# Patient Record
Sex: FEMALE | Race: WHITE
[De-identification: names, ages, dates, MRNs, and addresses within clinical notes are randomized per-mention and may not be internally consistent; named-entity substitution may affect disease eponyms.]

---

## 2020-05-09 ENCOUNTER — HOSPITAL ENCOUNTER (EMERGENCY)
Dept: HOSPITAL 19 - COL.ER | Age: 25
Discharge: HOME | End: 2020-05-09
Payer: SELF-PAY

## 2020-05-09 VITALS — SYSTOLIC BLOOD PRESSURE: 126 MMHG | TEMPERATURE: 98.7 F | DIASTOLIC BLOOD PRESSURE: 88 MMHG

## 2020-05-09 VITALS — HEIGHT: 65.98 IN | WEIGHT: 170.42 LBS | BODY MASS INDEX: 27.39 KG/M2

## 2020-05-09 VITALS — HEART RATE: 87 BPM

## 2020-05-09 DIAGNOSIS — Z3A.10: ICD-10-CM

## 2020-05-09 DIAGNOSIS — F17.210: ICD-10-CM

## 2020-05-09 DIAGNOSIS — O99.331: ICD-10-CM

## 2020-05-09 DIAGNOSIS — O21.9: Primary | ICD-10-CM

## 2020-05-09 LAB
ALBUMIN SERPL-MCNC: 4.7 GM/DL (ref 3.5–5)
ALP SERPL-CCNC: 43 U/L (ref 50–136)
ALT SERPL-CCNC: 15 U/L (ref 4–34)
ANION GAP SERPL CALC-SCNC: 12 MMOL/L (ref 7–16)
AST SERPL-CCNC: 21 U/L (ref 15–37)
B-HCG SERPL-SCNC: (no result) MIU/ML (ref 0–5)
BASOPHILS # BLD: 0 10*3/UL (ref 0–0.2)
BASOPHILS NFR BLD AUTO: 0.3 % (ref 0–2)
BILIRUB SERPL-MCNC: 0.8 MG/DL (ref 0–1)
BUN SERPL-MCNC: 11 MG/DL (ref 7–17)
CALCIUM SERPL-MCNC: 10 MG/DL (ref 8.4–10.2)
CHLORIDE SERPL-SCNC: 102 MMOL/L (ref 98–107)
CO2 SERPL-SCNC: 23 MMOL/L (ref 22–30)
CREAT SERPL-SCNC: 0.58 UMOL/L (ref 0.52–1.25)
CRP SERPL-MCNC: < 0.5 MG/DL (ref 0–0.9)
EOSINOPHIL # BLD: 0 10*3/UL (ref 0–0.7)
EOSINOPHIL NFR BLD: 0.2 % (ref 0–4)
ERYTHROCYTE [DISTWIDTH] IN BLOOD BY AUTOMATED COUNT: 11.9 % (ref 11.5–14.5)
GLUCOSE SERPL-MCNC: 100 MG/DL (ref 74–106)
GRANULOCYTES # BLD AUTO: 85.1 % (ref 42.2–75.2)
HCT VFR BLD AUTO: 43.9 % (ref 37–47)
HGB BLD-MCNC: 15.6 G/DL (ref 12.5–16)
HYALINE CASTS #/AREA URNS LPF: (no result) /LPF
KETONES UR STRIP.AUTO-MCNC: (no result) MG/DL
LYMPHOCYTES # BLD: 1.3 10*3/UL (ref 1.2–3.4)
LYMPHOCYTES NFR BLD: 10 % (ref 20–51)
MCH RBC QN AUTO: 31 PG (ref 27–31)
MCHC RBC AUTO-ENTMCNC: 36 G/DL (ref 33–37)
MCV RBC AUTO: 86 FL (ref 80–100)
MONOCYTES # BLD: 0.5 10*3/UL (ref 0.1–0.6)
MONOCYTES NFR BLD AUTO: 4.1 % (ref 1.7–9.3)
NEUTROPHILS # BLD: 10.8 10*3/UL (ref 1.4–6.5)
PH UR STRIP.AUTO: 5 [PH] (ref 5–8)
PLATELET # BLD AUTO: 172 K/MM3 (ref 130–400)
PMV BLD AUTO: 10.7 FL (ref 7.4–10.4)
POTASSIUM SERPL-SCNC: 3.6 MMOL/L (ref 3.4–5)
PROT SERPL-MCNC: 8 GM/DL (ref 6.4–8.2)
RBC # BLD AUTO: 5.11 M/MM3 (ref 4.1–5.3)
RBC # UR: (no result) /HPF
SODIUM SERPL-SCNC: 137 MMOL/L (ref 137–145)
SP GR UR STRIP.AUTO: 1.03 (ref 1–1.03)
UA DIPSTICK PNL UR STRIP.AUTO: (no result)
URN COLLECT METHOD CLASS: (no result)

## 2020-07-21 ENCOUNTER — HOSPITAL ENCOUNTER (EMERGENCY)
Dept: HOSPITAL 19 - COL.ER | Age: 25
Discharge: HOME | End: 2020-07-21
Payer: MEDICAID

## 2020-07-21 VITALS — TEMPERATURE: 98.5 F

## 2020-07-21 VITALS — HEIGHT: 65.98 IN | WEIGHT: 170.42 LBS | BODY MASS INDEX: 27.39 KG/M2

## 2020-07-21 VITALS — SYSTOLIC BLOOD PRESSURE: 113 MMHG | DIASTOLIC BLOOD PRESSURE: 58 MMHG | HEART RATE: 72 BPM

## 2020-07-21 DIAGNOSIS — O21.9: Primary | ICD-10-CM

## 2020-07-21 DIAGNOSIS — Z3A.19: ICD-10-CM

## 2020-07-21 DIAGNOSIS — O26.892: ICD-10-CM

## 2020-07-21 DIAGNOSIS — E87.6: ICD-10-CM

## 2020-07-21 LAB
ALBUMIN SERPL-MCNC: 4.3 GM/DL (ref 3.5–5)
ALP SERPL-CCNC: 50 U/L (ref 50–136)
ALT SERPL-CCNC: 15 U/L (ref 4–34)
ANION GAP SERPL CALC-SCNC: 11 MMOL/L (ref 7–16)
AST SERPL-CCNC: 25 U/L (ref 15–37)
BASOPHILS # BLD: 0 10*3/UL (ref 0–0.2)
BASOPHILS NFR BLD AUTO: 0.3 % (ref 0–2)
BILIRUB SERPL-MCNC: 0.8 MG/DL (ref 0–1)
BUN SERPL-MCNC: 6 MG/DL (ref 7–17)
CALCIUM SERPL-MCNC: 9.1 MG/DL (ref 8.4–10.2)
CHLORIDE SERPL-SCNC: 103 MMOL/L (ref 98–107)
CO2 SERPL-SCNC: 22 MMOL/L (ref 22–30)
CREAT SERPL-SCNC: 0.57 UMOL/L (ref 0.52–1.25)
CRP SERPL-MCNC: 1.2 MG/DL (ref 0–0.9)
EOSINOPHIL # BLD: 0 10*3/UL (ref 0–0.7)
EOSINOPHIL NFR BLD: 0.4 % (ref 0–4)
ERYTHROCYTE [DISTWIDTH] IN BLOOD BY AUTOMATED COUNT: 12.7 % (ref 11.5–14.5)
GLUCOSE SERPL-MCNC: 84 MG/DL (ref 74–106)
GRANULOCYTES # BLD AUTO: 80.5 % (ref 42.2–75.2)
HCT VFR BLD AUTO: 41.1 % (ref 37–47)
HGB BLD-MCNC: 14.5 G/DL (ref 12.5–16)
KETONES UR STRIP.AUTO-MCNC: (no result) MG/DL
LIPASE SERPL-CCNC: 43 U/L (ref 23–300)
LYMPHOCYTES # BLD: 1.5 10*3/UL (ref 1.2–3.4)
LYMPHOCYTES NFR BLD: 13.8 % (ref 20–51)
MCH RBC QN AUTO: 31 PG (ref 27–31)
MCHC RBC AUTO-ENTMCNC: 35 G/DL (ref 33–37)
MCV RBC AUTO: 87 FL (ref 80–100)
MONOCYTES # BLD: 0.5 10*3/UL (ref 0.1–0.6)
MONOCYTES NFR BLD AUTO: 4.6 % (ref 1.7–9.3)
NEUTROPHILS # BLD: 9 10*3/UL (ref 1.4–6.5)
PH UR STRIP.AUTO: 5 [PH] (ref 5–8)
PLATELET # BLD AUTO: 157 K/MM3 (ref 130–400)
PMV BLD AUTO: 11.3 FL (ref 7.4–10.4)
POTASSIUM SERPL-SCNC: 3.2 MMOL/L (ref 3.4–5)
PROT SERPL-MCNC: 7.6 GM/DL (ref 6.4–8.2)
RBC # BLD AUTO: 4.72 M/MM3 (ref 4.1–5.3)
RBC # UR: (no result) /HPF
SODIUM SERPL-SCNC: 135 MMOL/L (ref 137–145)
SP GR UR STRIP.AUTO: 1.02 (ref 1–1.03)
UA DIPSTICK PNL UR STRIP.AUTO: (no result)
URN COLLECT METHOD CLASS: (no result)

## 2020-11-08 ENCOUNTER — HOSPITAL ENCOUNTER (EMERGENCY)
Dept: HOSPITAL 19 - COL.ER | Age: 25
Discharge: HOME | End: 2020-11-08
Attending: EMERGENCY MEDICINE
Payer: MEDICAID

## 2020-11-08 VITALS — TEMPERATURE: 98.5 F | DIASTOLIC BLOOD PRESSURE: 80 MMHG | SYSTOLIC BLOOD PRESSURE: 115 MMHG

## 2020-11-08 VITALS — HEIGHT: 65.98 IN | BODY MASS INDEX: 27.39 KG/M2 | WEIGHT: 170.42 LBS

## 2020-11-08 VITALS — HEART RATE: 85 BPM

## 2020-11-08 DIAGNOSIS — Z3A.33: ICD-10-CM

## 2020-11-08 DIAGNOSIS — O99.613: Primary | ICD-10-CM

## 2020-11-08 DIAGNOSIS — K04.7: ICD-10-CM

## 2020-11-08 DIAGNOSIS — O99.332: ICD-10-CM

## 2020-12-07 ENCOUNTER — HOSPITAL ENCOUNTER (EMERGENCY)
Dept: HOSPITAL 19 - COL.ER | Age: 25
Discharge: HOME | End: 2020-12-07
Attending: EMERGENCY MEDICINE
Payer: MEDICAID

## 2020-12-07 VITALS — SYSTOLIC BLOOD PRESSURE: 126 MMHG | HEART RATE: 81 BPM | DIASTOLIC BLOOD PRESSURE: 83 MMHG

## 2020-12-07 VITALS — HEIGHT: 65.98 IN | WEIGHT: 173.28 LBS | BODY MASS INDEX: 27.85 KG/M2

## 2020-12-07 VITALS — TEMPERATURE: 97.7 F

## 2020-12-07 DIAGNOSIS — F17.210: ICD-10-CM

## 2020-12-07 DIAGNOSIS — K02.9: Primary | ICD-10-CM

## 2020-12-22 ENCOUNTER — HOSPITAL ENCOUNTER (OUTPATIENT)
Dept: HOSPITAL 19 - ZCOL.LAB | Age: 25
Discharge: STILL ADMITTED OR ADMINISTRATIVE | End: 2020-12-22
Attending: OBSTETRICS & GYNECOLOGY
Payer: MEDICAID

## 2020-12-22 DIAGNOSIS — Z20.828: Primary | ICD-10-CM

## 2021-01-01 PROCEDURE — 3E033VJ INTRODUCTION OF OTHER HORMONE INTO PERIPHERAL VEIN, PERCUTANEOUS APPROACH: ICD-10-PCS | Performed by: OBSTETRICS & GYNECOLOGY

## 2021-01-01 PROCEDURE — 10907ZC DRAINAGE OF AMNIOTIC FLUID, THERAPEUTIC FROM PRODUCTS OF CONCEPTION, VIA NATURAL OR ARTIFICIAL OPENING: ICD-10-PCS | Performed by: OBSTETRICS & GYNECOLOGY

## 2021-01-02 ENCOUNTER — HOSPITAL ENCOUNTER (INPATIENT)
Dept: HOSPITAL 19 - LDRO | Age: 26
LOS: 1 days | Discharge: HOME | End: 2021-01-03
Attending: OBSTETRICS & GYNECOLOGY | Admitting: OBSTETRICS & GYNECOLOGY
Payer: MEDICAID

## 2021-01-02 VITALS — HEART RATE: 49 BPM | DIASTOLIC BLOOD PRESSURE: 71 MMHG | SYSTOLIC BLOOD PRESSURE: 115 MMHG

## 2021-01-02 VITALS — HEART RATE: 51 BPM | SYSTOLIC BLOOD PRESSURE: 116 MMHG | DIASTOLIC BLOOD PRESSURE: 77 MMHG

## 2021-01-02 VITALS — SYSTOLIC BLOOD PRESSURE: 122 MMHG | DIASTOLIC BLOOD PRESSURE: 76 MMHG | HEART RATE: 53 BPM

## 2021-01-02 VITALS — SYSTOLIC BLOOD PRESSURE: 116 MMHG | DIASTOLIC BLOOD PRESSURE: 80 MMHG | HEART RATE: 52 BPM

## 2021-01-02 VITALS — HEART RATE: 61 BPM | DIASTOLIC BLOOD PRESSURE: 70 MMHG | SYSTOLIC BLOOD PRESSURE: 113 MMHG

## 2021-01-02 VITALS — HEART RATE: 53 BPM | SYSTOLIC BLOOD PRESSURE: 158 MMHG | DIASTOLIC BLOOD PRESSURE: 81 MMHG

## 2021-01-02 VITALS — DIASTOLIC BLOOD PRESSURE: 65 MMHG | HEART RATE: 71 BPM | SYSTOLIC BLOOD PRESSURE: 126 MMHG

## 2021-01-02 VITALS — HEART RATE: 65 BPM | DIASTOLIC BLOOD PRESSURE: 68 MMHG | SYSTOLIC BLOOD PRESSURE: 107 MMHG

## 2021-01-02 VITALS — DIASTOLIC BLOOD PRESSURE: 91 MMHG | SYSTOLIC BLOOD PRESSURE: 133 MMHG | HEART RATE: 53 BPM

## 2021-01-02 VITALS — DIASTOLIC BLOOD PRESSURE: 75 MMHG | SYSTOLIC BLOOD PRESSURE: 128 MMHG | HEART RATE: 52 BPM

## 2021-01-02 VITALS — HEART RATE: 51 BPM | DIASTOLIC BLOOD PRESSURE: 75 MMHG | SYSTOLIC BLOOD PRESSURE: 130 MMHG

## 2021-01-02 VITALS — TEMPERATURE: 98.8 F | SYSTOLIC BLOOD PRESSURE: 131 MMHG | DIASTOLIC BLOOD PRESSURE: 81 MMHG | HEART RATE: 71 BPM

## 2021-01-02 VITALS — HEART RATE: 51 BPM | SYSTOLIC BLOOD PRESSURE: 116 MMHG | DIASTOLIC BLOOD PRESSURE: 63 MMHG

## 2021-01-02 VITALS — HEART RATE: 47 BPM | DIASTOLIC BLOOD PRESSURE: 71 MMHG | SYSTOLIC BLOOD PRESSURE: 117 MMHG

## 2021-01-02 VITALS — DIASTOLIC BLOOD PRESSURE: 70 MMHG | HEART RATE: 49 BPM | SYSTOLIC BLOOD PRESSURE: 115 MMHG | TEMPERATURE: 97.8 F

## 2021-01-02 VITALS — SYSTOLIC BLOOD PRESSURE: 150 MMHG | HEART RATE: 66 BPM | DIASTOLIC BLOOD PRESSURE: 84 MMHG

## 2021-01-02 VITALS — HEART RATE: 78 BPM | SYSTOLIC BLOOD PRESSURE: 150 MMHG | DIASTOLIC BLOOD PRESSURE: 105 MMHG

## 2021-01-02 VITALS — SYSTOLIC BLOOD PRESSURE: 146 MMHG | DIASTOLIC BLOOD PRESSURE: 80 MMHG | HEART RATE: 64 BPM

## 2021-01-02 VITALS — DIASTOLIC BLOOD PRESSURE: 60 MMHG | SYSTOLIC BLOOD PRESSURE: 130 MMHG | HEART RATE: 50 BPM

## 2021-01-02 DIAGNOSIS — Z3A.41: ICD-10-CM

## 2021-01-02 DIAGNOSIS — O48.0: Primary | ICD-10-CM

## 2021-01-02 DIAGNOSIS — F12.90: ICD-10-CM

## 2021-01-02 LAB
BASOPHILS # BLD: 0 10*3/UL (ref 0–0.2)
BASOPHILS NFR BLD AUTO: 0.2 % (ref 0–2)
DRUGS UR SCN NOM: NEGATIVE NG/ML
EOSINOPHIL # BLD: 0.1 10*3/UL (ref 0–0.7)
EOSINOPHIL NFR BLD: 0.6 % (ref 0–4)
ERYTHROCYTE [DISTWIDTH] IN BLOOD BY AUTOMATED COUNT: 13 % (ref 11.5–14.5)
GRANULOCYTES # BLD AUTO: 76.9 % (ref 42.2–75.2)
HCT VFR BLD AUTO: 34.5 % (ref 37–47)
HGB BLD-MCNC: 11.5 G/DL (ref 12.5–16)
LYMPHOCYTES # BLD: 1.4 10*3/UL (ref 1.2–3.4)
LYMPHOCYTES NFR BLD: 15.4 % (ref 20–51)
MCH RBC QN AUTO: 27 PG (ref 27–31)
MCHC RBC AUTO-ENTMCNC: 33 G/DL (ref 33–37)
MCV RBC AUTO: 81 FL (ref 80–100)
MONOCYTES # BLD: 0.6 10*3/UL (ref 0.1–0.6)
MONOCYTES NFR BLD AUTO: 6.5 % (ref 1.7–9.3)
NEUTROPHILS # BLD: 7.1 10*3/UL (ref 1.4–6.5)
PLATELET # BLD AUTO: 158 K/MM3 (ref 130–400)
PMV BLD AUTO: 11.1 FL (ref 7.4–10.4)
RBC # BLD AUTO: 4.28 M/MM3 (ref 4.1–5.3)

## 2021-01-02 NOTE — NUR
Dr. Agudelo called and updated on patients bleeding. Let her know that she is
having more bleeding. 1338 Methergine 0.2 mg im given as ordered. Fundus
massaged to firm.

## 2021-01-02 NOTE — NUR
Ambulates to the bathroom, tolerates well. Voids moderate amount noted.
Hannah-care shown and done by patient. Ambulates to room 208.

## 2021-01-02 NOTE — NUR
Contacted and scheduled pt an apt for tomorrow pt verbalized understanding advised if he feels like he needs to be seen before apt to go to the ER pt verbalized understanding    Having emisis. 1052 Zofran 4 mg iv given as ordered.

## 2021-01-02 NOTE — NUR
Dr. Agudelo here, states still a bag of water, arom done. Moderate amount of
clear fluid noted. 1233 Spontaneous delivery of baby boy by Dr. Agudelo. 1236
Spontaneous delivery of placenta. Pitocin started at 333 ccs an hour as
ordered and per policy.

## 2021-01-02 NOTE — NUR
Presents to labor and delivery. States having contraction. Vag exam done,
dilated to four, eighty percent effaced, minus two. Breathes through
contractions. Dr. Agudelo called about patient. Let her know that this nurse had
some bleeding on glove after check. Patient also states had some bleeding this
morning when going to the bathroom.

## 2021-01-03 VITALS — HEART RATE: 70 BPM | SYSTOLIC BLOOD PRESSURE: 100 MMHG | DIASTOLIC BLOOD PRESSURE: 66 MMHG | TEMPERATURE: 98 F

## 2021-01-03 VITALS — TEMPERATURE: 98.3 F | DIASTOLIC BLOOD PRESSURE: 69 MMHG | SYSTOLIC BLOOD PRESSURE: 105 MMHG | HEART RATE: 74 BPM

## 2021-01-03 VITALS — HEART RATE: 69 BPM | TEMPERATURE: 98.6 F | DIASTOLIC BLOOD PRESSURE: 81 MMHG | SYSTOLIC BLOOD PRESSURE: 122 MMHG

## 2021-01-03 NOTE — NUR
Consult recieved for patient with substance in system. Patient labs indicated
marijuana use. Both her and baby test positive for Marijuana. SW met with
patient about screen. Patient confirms use and that she uses and does not see
a problem with smoking Marijuana. Patient reports that she has smokes with
every pregnancy and this does not make her a bad parent. Patient indicated
that she does not have a desire to stop unless she had too. Patient reports
that she resides in [place] with her Boyfriend and Father of Baby Jose Angel
Cousins and thier children. Patient denies having any concerns with taking
baby home. Patient reports craving and crankiness,after
3 days of non-use but denies it being a problem. Patient reports that she does
not have a PCP and her ob was Dr. Adair but has not decided on pediatric care.
Patient reports that she has supplies without any additional concers.  Patient
reports her phone is [phone]. Patient reports that she is a stay at
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 Patient reports concerns
of baby being taken for marijuana use. Educated on process of screening with
CPS. Report made see baby chart.
a concerns. Patient reports

## 2021-01-03 NOTE — NUR
1300-Patient and spouse viewed period of purple cry video. Reveiwed discharge
instructions with patient. Provided follow up appointment sheet and newly
prescribed ibuprophen information sheet. Discharged to boarder status per MD
orders.

## 2021-05-19 ENCOUNTER — HOSPITAL ENCOUNTER (EMERGENCY)
Dept: HOSPITAL 19 - COL.ER | Age: 26
Discharge: HOME | End: 2021-05-19
Attending: EMERGENCY MEDICINE
Payer: MEDICAID

## 2021-05-19 VITALS — BODY MASS INDEX: 27.18 KG/M2 | HEIGHT: 65.98 IN | WEIGHT: 169.09 LBS

## 2021-05-19 VITALS — SYSTOLIC BLOOD PRESSURE: 127 MMHG | HEART RATE: 74 BPM | TEMPERATURE: 98.1 F | DIASTOLIC BLOOD PRESSURE: 81 MMHG

## 2021-05-19 DIAGNOSIS — K92.0: ICD-10-CM

## 2021-05-19 DIAGNOSIS — F17.210: ICD-10-CM

## 2021-05-19 DIAGNOSIS — R10.13: Primary | ICD-10-CM

## 2021-05-19 DIAGNOSIS — Z32.02: ICD-10-CM

## 2021-05-19 LAB
ALBUMIN SERPL-MCNC: 4.6 GM/DL (ref 3.5–5)
ALP SERPL-CCNC: 47 U/L (ref 50–136)
ALT SERPL-CCNC: 18 U/L (ref 4–34)
ANION GAP SERPL CALC-SCNC: 8 MMOL/L (ref 7–16)
AST SERPL-CCNC: 23 U/L (ref 15–37)
BILIRUB SERPL-MCNC: 0.6 MG/DL (ref 0–1)
BUN SERPL-MCNC: 12 MG/DL (ref 7–17)
CALCIUM SERPL-MCNC: 9.3 MG/DL (ref 8.4–10.2)
CHLORIDE SERPL-SCNC: 105 MMOL/L (ref 98–107)
CO2 SERPL-SCNC: 22 MMOL/L (ref 22–30)
CREAT SERPL-SCNC: 0.64 UMOL/L (ref 0.52–1.25)
ERYTHROCYTE [DISTWIDTH] IN BLOOD BY AUTOMATED COUNT: 14.9 % (ref 11.5–14.5)
GLUCOSE SERPL-MCNC: 95 MG/DL (ref 74–106)
HCT VFR BLD AUTO: 39 % (ref 37–47)
HGB BLD-MCNC: 13.2 G/DL (ref 12.5–16)
LIPASE SERPL-CCNC: 52 U/L (ref 23–300)
LYMPHOCYTES NFR BLD MANUAL: 8 % (ref 20–51)
MCH RBC QN AUTO: 27 PG (ref 27–31)
MCHC RBC AUTO-ENTMCNC: 34 G/DL (ref 33–37)
MCV RBC AUTO: 81 FL (ref 80–100)
MONOCYTES NFR BLD: 4 % (ref 1.7–9.3)
NEUTS SEG NFR BLD MANUAL: 88 % (ref 42–75.2)
PH UR STRIP.AUTO: 5 [PH] (ref 5–8)
PLATELET # BLD AUTO: 132 K/MM3 (ref 130–400)
PLATELET BLD QL SMEAR: NORMAL
PMV BLD AUTO: 10.3 FL (ref 7.4–10.4)
POTASSIUM SERPL-SCNC: 3.5 MMOL/L (ref 3.4–5)
PROT SERPL-MCNC: 7.7 GM/DL (ref 6.4–8.2)
RBC # BLD AUTO: 4.83 M/MM3 (ref 4.1–5.3)
RBC # UR: (no result) /HPF
SODIUM SERPL-SCNC: 135 MMOL/L (ref 137–145)
SP GR UR STRIP.AUTO: 1.01 (ref 1–1.03)
SQUAMOUS # URNS: (no result) /HPF
URN COLLECT METHOD CLASS: (no result)

## 2021-06-11 ENCOUNTER — HOSPITAL ENCOUNTER (EMERGENCY)
Dept: HOSPITAL 19 - COL.ER | Age: 26
Discharge: HOME | End: 2021-06-11
Attending: FAMILY MEDICINE
Payer: MEDICAID

## 2021-06-11 VITALS — BODY MASS INDEX: 25.76 KG/M2 | HEIGHT: 66 IN | WEIGHT: 160.28 LBS

## 2021-06-11 VITALS — DIASTOLIC BLOOD PRESSURE: 74 MMHG | HEART RATE: 50 BPM | SYSTOLIC BLOOD PRESSURE: 114 MMHG

## 2021-06-11 VITALS — TEMPERATURE: 97.5 F

## 2021-06-11 DIAGNOSIS — Z32.02: ICD-10-CM

## 2021-06-11 DIAGNOSIS — F17.210: ICD-10-CM

## 2021-06-11 DIAGNOSIS — K29.70: Primary | ICD-10-CM

## 2021-06-11 LAB
ALBUMIN SERPL-MCNC: 3.9 GM/DL (ref 3.5–5)
ALP SERPL-CCNC: 53 U/L (ref 50–136)
ALT SERPL-CCNC: 24 U/L (ref 4–34)
ANION GAP SERPL CALC-SCNC: 5 MMOL/L (ref 7–16)
AST SERPL-CCNC: 84 U/L (ref 15–37)
BASOPHILS # BLD: 0 10*3/UL (ref 0–0.2)
BASOPHILS NFR BLD AUTO: 0.2 % (ref 0–2)
BILIRUB SERPL-MCNC: 0.4 MG/DL (ref 0–1)
BUN SERPL-MCNC: 11 MG/DL (ref 7–17)
CALCIUM SERPL-MCNC: 8.8 MG/DL (ref 8.4–10.2)
CHLORIDE SERPL-SCNC: 108 MMOL/L (ref 98–107)
CO2 SERPL-SCNC: 25 MMOL/L (ref 22–30)
CREAT SERPL-SCNC: 0.68 UMOL/L (ref 0.52–1.25)
CRP SERPL-MCNC: < 0.5 MG/DL (ref 0–0.9)
EOSINOPHIL # BLD: 0.1 10*3/UL (ref 0–0.7)
EOSINOPHIL NFR BLD: 1 % (ref 0–4)
ERYTHROCYTE [DISTWIDTH] IN BLOOD BY AUTOMATED COUNT: 13.6 % (ref 11.5–14.5)
GLUCOSE SERPL-MCNC: 99 MG/DL (ref 74–106)
GRANULOCYTES # BLD AUTO: 81.1 % (ref 42.2–75.2)
HCT VFR BLD AUTO: 35.9 % (ref 37–47)
HGB BLD-MCNC: 11.7 G/DL (ref 12.5–16)
LIPASE SERPL-CCNC: 259 U/L (ref 23–300)
LYMPHOCYTES # BLD: 1.1 10*3/UL (ref 1.2–3.4)
LYMPHOCYTES NFR BLD: 13.1 % (ref 20–51)
MCH RBC QN AUTO: 27 PG (ref 27–31)
MCHC RBC AUTO-ENTMCNC: 33 G/DL (ref 33–37)
MCV RBC AUTO: 82 FL (ref 80–100)
MONOCYTES # BLD: 0.4 10*3/UL (ref 0.1–0.6)
MONOCYTES NFR BLD AUTO: 4.2 % (ref 1.7–9.3)
NEUTROPHILS # BLD: 6.8 10*3/UL (ref 1.4–6.5)
PH UR STRIP.AUTO: 6 [PH] (ref 5–8)
PLATELET # BLD AUTO: 162 K/MM3 (ref 130–400)
PMV BLD AUTO: 10.8 FL (ref 7.4–10.4)
POTASSIUM SERPL-SCNC: 3.7 MMOL/L (ref 3.4–5)
PROT SERPL-MCNC: 6.8 GM/DL (ref 6.4–8.2)
RBC # BLD AUTO: 4.37 M/MM3 (ref 4.1–5.3)
RBC # UR: (no result) /HPF
SODIUM SERPL-SCNC: 139 MMOL/L (ref 137–145)
SP GR UR STRIP.AUTO: 1.02 (ref 1–1.03)
SQUAMOUS # URNS: (no result) /HPF
URN COLLECT METHOD CLASS: (no result)

## 2021-06-11 PROCEDURE — C9113 INJ PANTOPRAZOLE SODIUM, VIA: HCPCS
